# Patient Record
Sex: FEMALE | Race: WHITE | Employment: UNEMPLOYED | URBAN - METROPOLITAN AREA
[De-identification: names, ages, dates, MRNs, and addresses within clinical notes are randomized per-mention and may not be internally consistent; named-entity substitution may affect disease eponyms.]

---

## 2017-01-05 ENCOUNTER — ANTI-COAG VISIT (OUTPATIENT)
Dept: CARDIOLOGY CLINIC | Age: 72
End: 2017-01-05

## 2017-01-05 LAB — INR BLD: 1.5

## 2017-01-12 ENCOUNTER — ANTI-COAG VISIT (OUTPATIENT)
Dept: CARDIOLOGY CLINIC | Age: 72
End: 2017-01-12

## 2017-01-12 LAB — INR BLD: 2.4

## 2017-01-13 ENCOUNTER — HOSPITAL ENCOUNTER (OUTPATIENT)
Dept: OTHER | Age: 72
Discharge: HOME | End: 2017-01-13
Attending: NURSE PRACTITIONER | Admitting: NURSE PRACTITIONER

## 2017-01-26 ENCOUNTER — ANTI-COAG VISIT (OUTPATIENT)
Dept: CARDIOLOGY CLINIC | Age: 72
End: 2017-01-26

## 2017-01-26 LAB — INR BLD: 2.9

## 2017-02-03 ENCOUNTER — ANTI-COAG VISIT (OUTPATIENT)
Dept: CARDIOLOGY CLINIC | Age: 72
End: 2017-02-03

## 2017-02-03 LAB — INR BLD: 2.3

## 2017-02-09 ENCOUNTER — ANTI-COAG VISIT (OUTPATIENT)
Dept: CARDIOLOGY CLINIC | Age: 72
End: 2017-02-09

## 2017-02-09 LAB — INR BLD: 2.9

## 2017-02-16 ENCOUNTER — ANTI-COAG VISIT (OUTPATIENT)
Dept: CARDIOLOGY CLINIC | Age: 72
End: 2017-02-16

## 2017-02-16 LAB — INR BLD: 2.3

## 2017-02-23 ENCOUNTER — ANTI-COAG VISIT (OUTPATIENT)
Dept: CARDIOLOGY CLINIC | Age: 72
End: 2017-02-23

## 2017-02-23 LAB — INR BLD: 2.8

## 2017-03-02 ENCOUNTER — ANTI-COAG VISIT (OUTPATIENT)
Dept: CARDIOLOGY CLINIC | Age: 72
End: 2017-03-02

## 2017-03-02 LAB — INR BLD: 2.3

## 2017-03-09 ENCOUNTER — ANTI-COAG VISIT (OUTPATIENT)
Dept: CARDIOLOGY CLINIC | Age: 72
End: 2017-03-09

## 2017-03-09 LAB — INR BLD: 2.1

## 2017-03-17 ENCOUNTER — ANTI-COAG VISIT (OUTPATIENT)
Dept: CARDIOLOGY CLINIC | Age: 72
End: 2017-03-17

## 2017-03-17 LAB — INR BLD: 2.4

## 2017-03-24 ENCOUNTER — ANTI-COAG VISIT (OUTPATIENT)
Dept: CARDIOLOGY CLINIC | Age: 72
End: 2017-03-24

## 2017-03-24 LAB — INR BLD: 2

## 2017-03-30 ENCOUNTER — ANTI-COAG VISIT (OUTPATIENT)
Dept: CARDIOLOGY CLINIC | Age: 72
End: 2017-03-30

## 2017-03-30 ENCOUNTER — TELEPHONE (OUTPATIENT)
Dept: CARDIOLOGY CLINIC | Age: 72
End: 2017-03-30

## 2017-03-30 LAB — INR BLD: 2.7

## 2017-04-06 ENCOUNTER — ANTI-COAG VISIT (OUTPATIENT)
Dept: CARDIOLOGY CLINIC | Age: 72
End: 2017-04-06

## 2017-04-06 LAB — INR BLD: 2.7

## 2017-04-13 ENCOUNTER — ANTI-COAG VISIT (OUTPATIENT)
Dept: CARDIOLOGY CLINIC | Age: 72
End: 2017-04-13

## 2017-04-13 LAB — INR BLD: 2.5

## 2017-04-19 ENCOUNTER — ANTI-COAG VISIT (OUTPATIENT)
Dept: CARDIOLOGY CLINIC | Age: 72
End: 2017-04-19

## 2017-04-19 LAB — INR BLD: 2.7

## 2017-04-27 ENCOUNTER — ANTI-COAG VISIT (OUTPATIENT)
Dept: CARDIOLOGY CLINIC | Age: 72
End: 2017-04-27

## 2017-04-27 LAB — INR BLD: 2.2

## 2017-05-04 ENCOUNTER — ANTI-COAG VISIT (OUTPATIENT)
Dept: CARDIOLOGY CLINIC | Age: 72
End: 2017-05-04

## 2017-05-04 ENCOUNTER — TELEPHONE (OUTPATIENT)
Dept: CARDIOLOGY CLINIC | Age: 72
End: 2017-05-04

## 2017-05-04 LAB — INR BLD: 2.6

## 2017-05-11 ENCOUNTER — ANTI-COAG VISIT (OUTPATIENT)
Dept: CARDIOLOGY CLINIC | Age: 72
End: 2017-05-11

## 2017-05-11 LAB — INR BLD: 3

## 2017-05-19 ENCOUNTER — ANTI-COAG VISIT (OUTPATIENT)
Dept: CARDIOLOGY CLINIC | Age: 72
End: 2017-05-19

## 2017-05-19 LAB — INR BLD: 2.8

## 2017-05-25 ENCOUNTER — ANTI-COAG VISIT (OUTPATIENT)
Dept: CARDIOLOGY CLINIC | Age: 72
End: 2017-05-25

## 2017-05-25 ENCOUNTER — TELEPHONE (OUTPATIENT)
Dept: CARDIOLOGY CLINIC | Age: 72
End: 2017-05-25

## 2017-05-25 LAB — INR BLD: 2.9

## 2017-06-01 ENCOUNTER — ANTI-COAG VISIT (OUTPATIENT)
Dept: CARDIOLOGY CLINIC | Age: 72
End: 2017-06-01

## 2017-06-01 LAB — INR BLD: 2.3

## 2017-06-21 ENCOUNTER — ANTI-COAG VISIT (OUTPATIENT)
Dept: CARDIOLOGY CLINIC | Age: 72
End: 2017-06-21

## 2017-06-21 ENCOUNTER — OFFICE VISIT (OUTPATIENT)
Dept: CARDIOLOGY CLINIC | Age: 72
End: 2017-06-21

## 2017-06-21 VITALS
HEART RATE: 60 BPM | BODY MASS INDEX: 43.32 KG/M2 | WEIGHT: 260 LBS | SYSTOLIC BLOOD PRESSURE: 130 MMHG | HEIGHT: 65 IN | DIASTOLIC BLOOD PRESSURE: 84 MMHG

## 2017-06-21 DIAGNOSIS — I10 ESSENTIAL HYPERTENSION: ICD-10-CM

## 2017-06-21 DIAGNOSIS — Z95.2 S/P AVR (AORTIC VALVE REPLACEMENT): Primary | ICD-10-CM

## 2017-06-21 DIAGNOSIS — I35.9 AORTIC VALVE DISORDER: ICD-10-CM

## 2017-06-21 LAB — INR BLD: 2.8

## 2017-06-21 PROCEDURE — 4040F PNEUMOC VAC/ADMIN/RCVD: CPT | Performed by: NURSE PRACTITIONER

## 2017-06-21 PROCEDURE — 1036F TOBACCO NON-USER: CPT | Performed by: NURSE PRACTITIONER

## 2017-06-21 PROCEDURE — 1090F PRES/ABSN URINE INCON ASSESS: CPT | Performed by: NURSE PRACTITIONER

## 2017-06-21 PROCEDURE — 3014F SCREEN MAMMO DOC REV: CPT | Performed by: NURSE PRACTITIONER

## 2017-06-21 PROCEDURE — G8427 DOCREV CUR MEDS BY ELIG CLIN: HCPCS | Performed by: NURSE PRACTITIONER

## 2017-06-21 PROCEDURE — G8417 CALC BMI ABV UP PARAM F/U: HCPCS | Performed by: NURSE PRACTITIONER

## 2017-06-21 PROCEDURE — 99214 OFFICE O/P EST MOD 30 MIN: CPT | Performed by: NURSE PRACTITIONER

## 2017-06-21 PROCEDURE — 1123F ACP DISCUSS/DSCN MKR DOCD: CPT | Performed by: NURSE PRACTITIONER

## 2017-06-21 PROCEDURE — 3017F COLORECTAL CA SCREEN DOC REV: CPT | Performed by: NURSE PRACTITIONER

## 2017-06-21 PROCEDURE — G8400 PT W/DXA NO RESULTS DOC: HCPCS | Performed by: NURSE PRACTITIONER

## 2017-06-21 RX ORDER — ATORVASTATIN CALCIUM 20 MG/1
20 TABLET, FILM COATED ORAL
Qty: 30 TABLET | Refills: 3 | Status: SHIPPED | OUTPATIENT
Start: 2017-06-21 | End: 2017-08-14 | Stop reason: SDUPTHER

## 2017-06-30 ENCOUNTER — HOSPITAL ENCOUNTER (OUTPATIENT)
Dept: OTHER | Age: 72
Discharge: HOME | End: 2017-06-30
Attending: NURSE PRACTITIONER | Admitting: NURSE PRACTITIONER

## 2017-06-30 LAB
LEFT VENTRICULAR EJECTION FRACTION HIGH VALUE: 70 %
LEFT VENTRICULAR EJECTION FRACTION MODE: NORMAL
LV EF: 65 %
LV EF: 68 %
LVEF MODALITY: NORMAL

## 2017-08-14 RX ORDER — ATORVASTATIN CALCIUM 20 MG/1
20 TABLET, FILM COATED ORAL
Qty: 30 TABLET | Refills: 3 | Status: SHIPPED | OUTPATIENT
Start: 2017-08-14 | End: 2018-05-06 | Stop reason: SDUPTHER

## 2017-09-14 RX ORDER — WARFARIN SODIUM 5 MG/1
TABLET ORAL
Qty: 280 TABLET | Refills: 1 | Status: SHIPPED | OUTPATIENT
Start: 2017-09-14 | End: 2018-02-25 | Stop reason: SDUPTHER

## 2017-10-13 ENCOUNTER — TELEPHONE (OUTPATIENT)
Dept: CARDIOLOGY CLINIC | Age: 72
End: 2017-10-13

## 2017-10-13 NOTE — TELEPHONE ENCOUNTER
Assessment/Plan  1. HOCM -see below  Echo 6/13: There is moderate concentric left ventricular hypertrophy with evidence of hypertrophic obstructive cardiomyopathy. Resting max gradient of 33 mmHg. Valsalva max gradient of 104 mmHg. There is hyperdynamic LV systolic with near cavity obliteration. EF 70%. There is reversal of E/A inflow velocities across the mitral valve suggesting impaired left ventricular relaxation. E/e''= 20.Dilated left atrium. Moderate aortic stenosis. Mild pulmonic insufficiency. Mild tricuspid regurgitation and PHTN. RVSP 46mmHg  2. Unspecified essential hypertension -well controlled   3. sleep apnea -is using  cpap    4. Shortness of breath --unchanged    5. Other malaise and fatigue -continues per patient,check labs   6. Other and unspecified hyperlipidemia -labs 3/15: ; TRIG 202; HDL 42; LDL 36  States did not tolerate crestor or lipitor due to leg aches  Try lipitor 20 mg three times a week, labs 6 weeks   7. Aortic regurgitation/stenosis -stable  SURGERY 3/18/15: AVR 23mm trifecta porcine, septal myectomy   Rt/Lt heart cath 27/64/02: AV data: Systolic gradient - 53, Mean gradient 62, DANA (TD) 0.74  LVgram - concentric hypertrophy with cavity obliteration in the mid segment. EF 75-80% Pigtail placed in the LVOT for simultaneous pressure gradient measurements to avoid any influence of obstructive cardiomyopathy. Cors: Normal      PLAN:     No changes  Thank you for allowing to me to participate in the care of Jodie Tatum.

## 2017-10-13 NOTE — TELEPHONE ENCOUNTER
Needs to have a tooth pulled 10/16/17 at 2pm . She is on warfarin , what should she do? Should she hold her warfarin ? Please call today .

## 2017-10-19 ENCOUNTER — TELEPHONE (OUTPATIENT)
Dept: CARDIOLOGY CLINIC | Age: 72
End: 2017-10-19

## 2017-10-19 DIAGNOSIS — I48.0 PAROXYSMAL ATRIAL FIBRILLATION (HCC): Primary | ICD-10-CM

## 2017-10-19 NOTE — TELEPHONE ENCOUNTER
Pt called and she needs an INR done prior to Dental procedure tomorrow. Order was faxed to [phone] [place] [address] in [place].

## 2017-10-24 ENCOUNTER — ANTI-COAG VISIT (OUTPATIENT)
Dept: CARDIOLOGY CLINIC | Age: 72
End: 2017-10-24

## 2017-10-24 ENCOUNTER — TELEPHONE (OUTPATIENT)
Dept: CARDIOLOGY CLINIC | Age: 72
End: 2017-10-24

## 2017-10-24 LAB — INR BLD: 1.5

## 2017-11-15 ENCOUNTER — ANTI-COAG VISIT (OUTPATIENT)
Dept: CARDIOLOGY CLINIC | Age: 72
End: 2017-11-15

## 2017-11-15 DIAGNOSIS — I48.0 PAROXYSMAL ATRIAL FIBRILLATION (HCC): ICD-10-CM

## 2017-11-15 LAB
INR BLD: 3
INTERNATIONAL NORMALIZATION RATIO, POC: 3
PROTHROMBIN TIME, POC: NORMAL

## 2017-11-15 PROCEDURE — 85610 PROTHROMBIN TIME: CPT | Performed by: INTERNAL MEDICINE

## 2017-12-20 ENCOUNTER — OFFICE VISIT (OUTPATIENT)
Dept: CARDIOLOGY CLINIC | Age: 72
End: 2017-12-20

## 2017-12-20 ENCOUNTER — ANTI-COAG VISIT (OUTPATIENT)
Dept: CARDIOLOGY CLINIC | Age: 72
End: 2017-12-20

## 2017-12-20 VITALS
DIASTOLIC BLOOD PRESSURE: 74 MMHG | HEIGHT: 66 IN | OXYGEN SATURATION: 91 % | SYSTOLIC BLOOD PRESSURE: 96 MMHG | WEIGHT: 260 LBS | HEART RATE: 73 BPM | BODY MASS INDEX: 41.78 KG/M2

## 2017-12-20 DIAGNOSIS — Z95.2 S/P AVR (AORTIC VALVE REPLACEMENT): ICD-10-CM

## 2017-12-20 DIAGNOSIS — E78.2 MIXED HYPERLIPIDEMIA: Primary | ICD-10-CM

## 2017-12-20 DIAGNOSIS — G47.33 OBSTRUCTIVE SLEEP APNEA SYNDROME: ICD-10-CM

## 2017-12-20 LAB — INR BLD: 2.9

## 2017-12-20 PROCEDURE — G8484 FLU IMMUNIZE NO ADMIN: HCPCS | Performed by: NURSE PRACTITIONER

## 2017-12-20 PROCEDURE — 1036F TOBACCO NON-USER: CPT | Performed by: NURSE PRACTITIONER

## 2017-12-20 PROCEDURE — 99214 OFFICE O/P EST MOD 30 MIN: CPT | Performed by: NURSE PRACTITIONER

## 2017-12-20 PROCEDURE — G8427 DOCREV CUR MEDS BY ELIG CLIN: HCPCS | Performed by: NURSE PRACTITIONER

## 2017-12-20 PROCEDURE — 4040F PNEUMOC VAC/ADMIN/RCVD: CPT | Performed by: NURSE PRACTITIONER

## 2017-12-20 PROCEDURE — 1090F PRES/ABSN URINE INCON ASSESS: CPT | Performed by: NURSE PRACTITIONER

## 2017-12-20 PROCEDURE — 3017F COLORECTAL CA SCREEN DOC REV: CPT | Performed by: NURSE PRACTITIONER

## 2017-12-20 PROCEDURE — 1123F ACP DISCUSS/DSCN MKR DOCD: CPT | Performed by: NURSE PRACTITIONER

## 2017-12-20 PROCEDURE — G8400 PT W/DXA NO RESULTS DOC: HCPCS | Performed by: NURSE PRACTITIONER

## 2017-12-20 PROCEDURE — G8417 CALC BMI ABV UP PARAM F/U: HCPCS | Performed by: NURSE PRACTITIONER

## 2017-12-20 PROCEDURE — 3014F SCREEN MAMMO DOC REV: CPT | Performed by: NURSE PRACTITIONER

## 2017-12-20 RX ORDER — GLYCOPYRROLATE 1 MG/1
2 TABLET ORAL 3 TIMES DAILY
COMMUNITY

## 2017-12-20 RX ORDER — AZITHROMYCIN 250 MG/1
250 TABLET, FILM COATED ORAL DAILY
COMMUNITY
End: 2018-07-18

## 2017-12-20 RX ORDER — RANITIDINE 150 MG/1
150 CAPSULE ORAL EVERY EVENING
COMMUNITY

## 2017-12-20 RX ORDER — IBUPROFEN 200 MG
200 TABLET ORAL EVERY 6 HOURS PRN
COMMUNITY
End: 2018-07-18

## 2017-12-20 RX ORDER — ZINC GLUCONATE 50 MG
50 TABLET ORAL DAILY
COMMUNITY

## 2017-12-20 NOTE — PROGRESS NOTES
([facility] [address] Diabetes mellitus ([facility] [address] HIT (heparin-induced thrombocytopenia) ([facility] [address] Hyperlipidemia; Hypertension; Irregular heart beat; Perforated diverticulitis; Sleep apnea; and Valvular disease. Surgical History:   has a past surgical history that includes Hysterectomy (1977); sinus surgery (2001); Foot surgery; Knee arthroscopy (Left, 6-8-2012, 2009); Finger surgery (Left, 8/22/13); Knee arthroscopy (Left, 12/2014); Aortic valve replacement (03/18/15); Abdominal exploration surgery (3/23/15); Gastrostomy tube placement (4/3/2015); and Tracheostomy tube placement (4/3/2015). Social History:  History     Social History    Marital Status: , has a significant other      Spouse Name: N/A     Number of Children: N/A    Years of Education: N/A     Social History Main Topics    Smoking status: None    Smokeless tobacco: None    Comment: quit in 1989 - smoked 2 ppd    Alcohol Use: 1.2 oz/week     2 Cans of beer per week      moderate       Family History:  family history includes Arthritis in an other family member; High Blood Pressure in an other family member; Kidney Disease in her father. Allergies:  Latex; Ace inhibitors; Crestor [rosuvastatin calcium]; Ezetimibe; Versed [midazolam]; Atorvastatin; Heparin; Ciprofloxacin; and Penicillins     Review of Systems:   · Constitutional: there has been no unanticipated weight loss. Fatigued   · Eyes: No visual changes   · ENT: No Headaches, hearing loss or vertigo. No mouth sores or sore throat. · Cardiovascular: Reviewed in HPI  · Respiratory: No cough or wheezing, no sputum production. short of breath  · Gastrointestinal: No abdominal pain, appetite loss, blood in stools. No change in bowel or bladder habits. · Genitourinary: No nocturia, dysuria, trouble voiding  · Musculoskeletal:  No gait disturbance, weakness or joint complaints. · Integumentary: No rash or pruritis. · Neurological: No headache, change in muscle strength, numbness or tingling.  No change in gait, balance, coordination, mood, affect, memory, mentation, behavior. · Psychiatric: No anxiety or depression  · Endocrine: No malaise or fever  · Hematologic/Lymphatic: No abnormal bruising or bleeding, blood clots or swollen lymph nodes. · Allergic/Immunologic: No nasal congestion or hives. Physical Examination:    Vitals:    12/20/17 1330   BP: 96/74   Site: Left Arm   Position: Sitting   Cuff Size: Large Adult   Pulse: 73   SpO2: 91%   Weight: 260 lb (117.9 kg)   Height: 5' 6\" (1.676 m)     Wt Readings from Last 3 Encounters:   12/20/17 260 lb (117.9 kg)   06/21/17 260 lb (117.9 kg)   12/28/16 261 lb (118.4 kg)     BP Readings from Last 3 Encounters:   12/20/17 96/74   06/21/17 130/84   12/28/16 118/68     Constitutional and General Appearance:  appears stated age, morbidly obese with a BMI of 41  Respiratory:  · Normal excursion and expansion without use of accessory muscles  · Resp Auscultation: Normal breath sounds without dullness  Cardiovascular:  · The apical impulses not displaced  · Heart is regular rate and rhythm with normal S1, S2,  2/6 sys ej murmur  · The carotid upstroke is normal, no bruit noted   · JVP is not elevated  · Peripheral pulses are symmetrical  · There is no clubbing, cyanosis of the extremities  · trace edema  · Femoral Arteries: 2+ and equal  · Pedal Pulses: 2+ and equal   Abdomen:  · No masses or tenderness  · Normal bowel sounds, colostomy  Neurological/Psychiatric:  · Alert and oriented x3  · Moves all extremities well  · Exhibits normal gait balance and coordination, moves slowly, walks with cane      Assessment/Plan  1. HOCM -see below  6/17Summary   -Normal left ventricle size and systolic function with an estimated ejection   fraction of 65-70%.    -No regional wall motion abnormalities are seen.   -Mild concentric left ventricular hypertrophy is present.   -Diastolic filling parameters suggests grade II diastolic dysfunction .   E/e'= 24.8 .   -Severe mitral

## 2017-12-20 NOTE — LETTER
also developed thrombocytopenia and was positive for HIT   Walks with a walker and cane at  home [place] complains of fatigue which has not changed. She denies any chest pain, palpitations, dizziness, or edema. She has a c-pap but has been using it. She is trying to be more active       Past Medical History:   has a past medical history of AR (aortic regurgitation); Arthritis; Asthma; Cancer ([facility] [address] COPD (chronic obstructive pulmonary disease) ([facility] [address] Diabetes mellitus ([facility] [address] [facility] thrombocyt[facility] Hyperlipidemia; Hypertension; Irregular heart beat; Perforated diverticulitis; Sleep apnea; and Valvular disease. Surgical History:   has a past surgical history that includes Hysterectomy (1977); sinus surgery (2001); Foot surgery; Knee arthroscopy (Left, 6-8-2012, 2009); Finger surgery (Left, 8/22/13); Knee arthroscopy (Left, 12/2014); Aortic valve replacement (03/18/15); Abdominal exploration surgery (3/23/15); Gastrostomy tube placement (4/3/2015); and Tracheostomy tube placement (4/3/2015). Social History:  History     Social History    Marital Status: , has a significant other      Spouse Name: N/A     Number of Children: N/A    Years of Education: N/A     Social History Main Topics    Smoking status: None    Smokeless tobacco: None    Comment: quit in 1989 - smoked 2 ppd    Alcohol Use: 1.2 oz/week     2 Cans of beer per week      moderate       Family History:  family history includes Arthritis in an other family member; High Blood Pressure in an other family member; Kidney Disease in her father. Allergies:  Latex; Ace inhibitors; Crestor [rosuvastatin calcium]; Ezetimibe; Versed [midazolam]; Atorvastatin; Heparin; Ciprofloxacin; and Penicillins     Review of Systems:   · Constitutional: there has been no unanticipated weight loss. Fatigued   · Eyes: No visual changes   · ENT: No Headaches, hearing loss or vertigo. No mouth sores or sore throat. · Cardiovascular: Reviewed in HPI  · Respiratory: No cough or wheezing, no sputum production. short of breath  · Gastrointestinal: No abdominal pain, appetite loss, blood in stools. No change in bowel or bladder habits. · Genitourinary: No nocturia, dysuria, trouble voiding  · Musculoskeletal:  No gait disturbance, weakness or joint complaints. · Integumentary: No rash or pruritis. · Neurological: No headache, change in muscle strength, numbness or tingling. No change in gait, balance, coordination, mood, affect, memory, mentation, behavior. · Psychiatric: No anxiety or depression  · Endocrine: No malaise or fever  · Hematologic/Lymphatic: No abnormal bruising or bleeding, blood clots or swollen lymph nodes. · Allergic/Immunologic: No nasal congestion or hives.     Physical Examination:    Vitals:    12/20/17 1330   BP: 96/74   Site: Left Arm   Position: Sitting   Cuff Size: Large Adult   Pulse: 73   SpO2: 91%   Weight: 260 lb (117.9 kg)   Height: 5' 6\" (1.676 m)     Wt Readings from Last 3 Encounters:   12/20/17 260 lb (117.9 kg)   06/21/17 260 lb (117.9 kg)   12/28/16 261 lb (118.4 kg)     BP Readings from Last 3 Encounters:   12/20/17 96/74   06/21/17 130/84   12/28/16 118/68     Constitutional and General Appearance:  appears stated age, morbidly obese with a BMI of 41  Respiratory:  · Normal excursion and expansion without use of accessory muscles  · Resp Auscultation: Normal breath sounds without dullness  Cardiovascular:  · The apical impulses not displaced  · Heart is regular rate and rhythm with normal S1, S2,  2/6 sys ej murmur  · The carotid upstroke is normal, no bruit noted   · JVP is not elevated  · Peripheral pulses are symmetrical  · There is no clubbing, cyanosis of the extremities  · trace edema  · Femoral Arteries: 2+ and equal  · Pedal Pulses: 2+ and equal   Abdomen:  · No masses or tenderness  · Normal bowel sounds, colostomy  Neurological/Psychiatric:  · Alert and oriented x3

## 2018-01-08 NOTE — COMMUNICATION BODY
[address]   Cardiac Evaluation      Patient: Rishi Urban  YOB: 1945  Date: 12/20/17       Chief Complaint   Patient presents with    Follow-up     No cardiac complaints    Hypertension    Hyperlipidemia    Shortness of Breath    Atrial Fibrillation        Referring provider: Ebenezer Avila    History of Present Illness:   Ms Melissa Venegas is seen today in follow up. She is bothered by cough, which she has been on AB for two days now. .   History includes aortic stenosis, hypertension, and hyperlipidemia. She had angiogram in 2005 which revealed normal coronaries and mild aortic stenosis. She also has hypertrophic obstructive cardiomyopathy. She had  Rt/Lt heart cath 10/2014 that revealed worsening aortic valve gradient with severe AS. Callum Patel underwent tissue AVR/Myectomy on 3/18/2015 . She had a long and complicated postoperative course. The original surgery (AVR/Mtoectomy) went well, the patient did recovery and was ambulating shortly after surgery. On POD # 5 she was having difficulty breathing. A CXR was done to r/o possible pleural effusions and it was noted that she had free air under right diaphragm. She was taken immediately to surgery where she underwent a exploratory lap sigmoid colon resection with colostomy due to a perforated sigmoid diverticulitis, which she still has colostomy. Following bowel surgery it was a challenge to get extubated, due to ARDS. She also had a bout with A-Fib and then had a Peg and Trach placed. She also developed thrombocytopenia and was positive for HIT   Walks with a walker and cane at  home Callum Patel complains of fatigue which has not changed. She denies any chest pain, palpitations, dizziness, or edema. She has a c-pap but has been using it. She is trying to be more active       Past Medical History:   has a past medical history of AR (aortic regurgitation); Arthritis;  Asthma; Cancer ([facility] [address] COPD (chronic obstructive pulmonary disease) change in gait, balance, coordination, mood, affect, memory, mentation, behavior. · Psychiatric: No anxiety or depression  · Endocrine: No malaise or fever  · Hematologic/Lymphatic: No abnormal bruising or bleeding, blood clots or swollen lymph nodes. · Allergic/Immunologic: No nasal congestion or hives. Physical Examination:    Vitals:    12/20/17 1330   BP: 96/74   Site: Left Arm   Position: Sitting   Cuff Size: Large Adult   Pulse: 73   SpO2: 91%   Weight: 260 lb (117.9 kg)   Height: 5' 6\" (1.676 m)     Wt Readings from Last 3 Encounters:   12/20/17 260 lb (117.9 kg)   06/21/17 260 lb (117.9 kg)   12/28/16 261 lb (118.4 kg)     BP Readings from Last 3 Encounters:   12/20/17 96/74   06/21/17 130/84   12/28/16 118/68     Constitutional and General Appearance:  appears stated age, morbidly obese with a BMI of 41  Respiratory:  · Normal excursion and expansion without use of accessory muscles  · Resp Auscultation: Normal breath sounds without dullness  Cardiovascular:  · The apical impulses not displaced  · Heart is regular rate and rhythm with normal S1, S2,  2/6 sys ej murmur  · The carotid upstroke is normal, no bruit noted   · JVP is not elevated  · Peripheral pulses are symmetrical  · There is no clubbing, cyanosis of the extremities  · trace edema  · Femoral Arteries: 2+ and equal  · Pedal Pulses: 2+ and equal   Abdomen:  · No masses or tenderness  · Normal bowel sounds, colostomy  Neurological/Psychiatric:  · Alert and oriented x3  · Moves all extremities well  · Exhibits normal gait balance and coordination, moves slowly, walks with cane      Assessment/Plan  1. HOCM -see below  6/17Summary   -Normal left ventricle size and systolic function with an estimated ejection   fraction of 65-70%.    -No regional wall motion abnormalities are seen.   -Mild concentric left ventricular hypertrophy is present.   -Diastolic filling parameters suggests grade II diastolic dysfunction .   E/e'= 24.8 .   -Severe mitral annular calcification is present.   -Calcification of the mitral valve noted.   -Mild mitral regurgitation is present.   -A bioprosthetic aortic valve appears well seated with a maximum gradient of   25 mmHg and a mean gradient of 13 mmHg.   -There is mild tricuspid regurgitation with RVSP estimated at 41 mmHg.   -Moderately dilated left atrium. Echo 6/13: There is moderate concentric left ventricular hypertrophy with evidence of hypertrophic obstructive cardiomyopathy. Resting max gradient of 33 mmHg. Valsalva max gradient of 104 mmHg. There is hyperdynamic LV systolic with near cavity obliteration. EF 70%. There is reversal of E/A inflow velocities across the mitral valve suggesting impaired left ventricular relaxation. E/e''= 20.Dilated left atrium. Moderate aortic stenosis. Mild pulmonic insufficiency. Mild tricuspid regurgitation and PHTN. RVSP 46mmHg  2. Unspecified essential hypertension -well controlled   3. sleep apnea -is using  cpap    4. Shortness of breath --unchanged    5. Other malaise and fatigue -continues per patient,check labs   6. Other and unspecified hyperlipidemia -labs 3/15: ; TRIG 202; HDL 42; LDL 36  States did not tolerate crestor or lipitor due to leg aches   lipitor 20 mg three times a week, needs to get done   7. Aortic regurgitation/stenosis -stable  SURGERY 3/18/15: AVR 23mm trifecta porcine, septal myectomy   Rt/Lt heart cath 24/58/48: AV data: Systolic gradient - 53, Mean gradient 62, DANA (TD) 0.74  LVgram - concentric hypertrophy with cavity obliteration in the mid segment. EF 75-80% Pigtail placed in the LVOT for simultaneous pressure gradient measurements to avoid any influence of obstructive cardiomyopathy. Cors: Normal     PLAN:    No changes, encouraged to get out of chair every hour for at least 10 min during the day  Thank you for allowing to me to participate in the care of Gabriella Odonnell.

## 2018-01-10 ENCOUNTER — ANTI-COAG VISIT (OUTPATIENT)
Dept: CARDIOLOGY CLINIC | Age: 73
End: 2018-01-10

## 2018-01-10 DIAGNOSIS — I48.0 PAROXYSMAL ATRIAL FIBRILLATION (HCC): ICD-10-CM

## 2018-01-10 LAB
INR BLD: 3.8
INTERNATIONAL NORMALIZATION RATIO, POC: 3.8
PROTHROMBIN TIME, POC: NORMAL

## 2018-01-10 PROCEDURE — 85610 PROTHROMBIN TIME: CPT | Performed by: INTERNAL MEDICINE

## 2018-02-02 ENCOUNTER — ANTI-COAG VISIT (OUTPATIENT)
Dept: CARDIOLOGY CLINIC | Age: 73
End: 2018-02-02

## 2018-02-02 DIAGNOSIS — I48.0 PAROXYSMAL ATRIAL FIBRILLATION (HCC): ICD-10-CM

## 2018-02-02 LAB
INR BLD: 3.6
INTERNATIONAL NORMALIZATION RATIO, POC: 3.6
PROTHROMBIN TIME, POC: NORMAL

## 2018-02-02 PROCEDURE — 85610 PROTHROMBIN TIME: CPT | Performed by: INTERNAL MEDICINE

## 2018-02-28 ENCOUNTER — ANTI-COAG VISIT (OUTPATIENT)
Dept: CARDIOLOGY CLINIC | Age: 73
End: 2018-02-28

## 2018-02-28 DIAGNOSIS — I48.0 PAROXYSMAL ATRIAL FIBRILLATION (HCC): ICD-10-CM

## 2018-02-28 LAB
INR BLD: 3.1
INTERNATIONAL NORMALIZATION RATIO, POC: 3.1
PROTHROMBIN TIME, POC: NORMAL

## 2018-02-28 PROCEDURE — 85610 PROTHROMBIN TIME: CPT | Performed by: INTERNAL MEDICINE

## 2018-02-28 RX ORDER — WARFARIN SODIUM 5 MG/1
TABLET ORAL
Qty: 65 TABLET | Refills: 3 | Status: SHIPPED | OUTPATIENT
Start: 2018-02-28 | End: 2018-07-29 | Stop reason: SDUPTHER

## 2018-03-28 ENCOUNTER — ANTI-COAG VISIT (OUTPATIENT)
Dept: CARDIOLOGY CLINIC | Age: 73
End: 2018-03-28

## 2018-03-28 DIAGNOSIS — I48.0 PAROXYSMAL ATRIAL FIBRILLATION (HCC): ICD-10-CM

## 2018-03-28 LAB
INTERNATIONAL NORMALIZATION RATIO, POC: 2.3
PROTHROMBIN TIME, POC: NORMAL

## 2018-03-28 PROCEDURE — 85610 PROTHROMBIN TIME: CPT | Performed by: INTERNAL MEDICINE

## 2018-04-24 ENCOUNTER — TELEPHONE (OUTPATIENT)
Dept: CARDIOLOGY CLINIC | Age: 73
End: 2018-04-24

## 2018-04-25 ENCOUNTER — ANTI-COAG VISIT (OUTPATIENT)
Dept: CARDIOLOGY CLINIC | Age: 73
End: 2018-04-25

## 2018-04-25 ENCOUNTER — OFFICE VISIT (OUTPATIENT)
Dept: CARDIOLOGY CLINIC | Age: 73
End: 2018-04-25

## 2018-04-25 VITALS
OXYGEN SATURATION: 96 % | SYSTOLIC BLOOD PRESSURE: 130 MMHG | HEART RATE: 66 BPM | WEIGHT: 256 LBS | DIASTOLIC BLOOD PRESSURE: 80 MMHG | BODY MASS INDEX: 41.14 KG/M2 | HEIGHT: 66 IN

## 2018-04-25 DIAGNOSIS — Z95.2 S/P AVR (AORTIC VALVE REPLACEMENT): ICD-10-CM

## 2018-04-25 DIAGNOSIS — R06.02 SOB (SHORTNESS OF BREATH): Primary | ICD-10-CM

## 2018-04-25 LAB — INR BLD: 2.7

## 2018-04-25 PROCEDURE — 99999 PR OFFICE/OUTPT VISIT,PROCEDURE ONLY: CPT | Performed by: NURSE PRACTITIONER

## 2018-04-25 PROCEDURE — 93000 ELECTROCARDIOGRAM COMPLETE: CPT | Performed by: NURSE PRACTITIONER

## 2018-05-07 RX ORDER — ATORVASTATIN CALCIUM 20 MG/1
TABLET, FILM COATED ORAL
Qty: 36 TABLET | Refills: 2 | Status: SHIPPED | OUTPATIENT
Start: 2018-05-07

## 2018-05-30 ENCOUNTER — ANTI-COAG VISIT (OUTPATIENT)
Dept: CARDIOLOGY CLINIC | Age: 73
End: 2018-05-30

## 2018-05-30 DIAGNOSIS — I48.0 PAROXYSMAL ATRIAL FIBRILLATION (HCC): ICD-10-CM

## 2018-05-30 LAB
INR BLD: 2.8
INTERNATIONAL NORMALIZATION RATIO, POC: 2.8
PROTHROMBIN TIME, POC: NORMAL

## 2018-05-30 PROCEDURE — 85610 PROTHROMBIN TIME: CPT | Performed by: INTERNAL MEDICINE

## 2018-06-26 VITALS — HEIGHT: 66 IN

## 2018-06-27 ENCOUNTER — OFFICE VISIT (OUTPATIENT)
Dept: CARDIOLOGY CLINIC | Age: 73
End: 2018-06-27

## 2018-06-27 DIAGNOSIS — I25.10 CORONARY ARTERY DISEASE INVOLVING NATIVE CORONARY ARTERY OF NATIVE HEART WITHOUT ANGINA PECTORIS: Primary | ICD-10-CM

## 2018-07-18 ENCOUNTER — ANTI-COAG VISIT (OUTPATIENT)
Dept: CARDIOLOGY CLINIC | Age: 73
End: 2018-07-18

## 2018-07-18 ENCOUNTER — OFFICE VISIT (OUTPATIENT)
Dept: CARDIOLOGY CLINIC | Age: 73
End: 2018-07-18

## 2018-07-18 VITALS
HEIGHT: 66 IN | OXYGEN SATURATION: 94 % | HEART RATE: 72 BPM | SYSTOLIC BLOOD PRESSURE: 114 MMHG | BODY MASS INDEX: 41.46 KG/M2 | WEIGHT: 258 LBS | DIASTOLIC BLOOD PRESSURE: 74 MMHG

## 2018-07-18 DIAGNOSIS — I10 ESSENTIAL HYPERTENSION: ICD-10-CM

## 2018-07-18 DIAGNOSIS — I35.9 AORTIC VALVE DISORDER: ICD-10-CM

## 2018-07-18 DIAGNOSIS — E78.2 MIXED HYPERLIPIDEMIA: ICD-10-CM

## 2018-07-18 DIAGNOSIS — Z95.2 S/P AVR (AORTIC VALVE REPLACEMENT): ICD-10-CM

## 2018-07-18 DIAGNOSIS — R06.02 SHORTNESS OF BREATH: Primary | ICD-10-CM

## 2018-07-18 LAB — INR BLD: 2

## 2018-07-18 PROCEDURE — G8598 ASA/ANTIPLAT THER USED: HCPCS | Performed by: NURSE PRACTITIONER

## 2018-07-18 PROCEDURE — 1123F ACP DISCUSS/DSCN MKR DOCD: CPT | Performed by: NURSE PRACTITIONER

## 2018-07-18 PROCEDURE — 1090F PRES/ABSN URINE INCON ASSESS: CPT | Performed by: NURSE PRACTITIONER

## 2018-07-18 PROCEDURE — 1036F TOBACCO NON-USER: CPT | Performed by: NURSE PRACTITIONER

## 2018-07-18 PROCEDURE — G8427 DOCREV CUR MEDS BY ELIG CLIN: HCPCS | Performed by: NURSE PRACTITIONER

## 2018-07-18 PROCEDURE — G8400 PT W/DXA NO RESULTS DOC: HCPCS | Performed by: NURSE PRACTITIONER

## 2018-07-18 PROCEDURE — 99214 OFFICE O/P EST MOD 30 MIN: CPT | Performed by: NURSE PRACTITIONER

## 2018-07-18 PROCEDURE — G8417 CALC BMI ABV UP PARAM F/U: HCPCS | Performed by: NURSE PRACTITIONER

## 2018-07-18 PROCEDURE — 4040F PNEUMOC VAC/ADMIN/RCVD: CPT | Performed by: NURSE PRACTITIONER

## 2018-07-18 PROCEDURE — 1101F PT FALLS ASSESS-DOCD LE1/YR: CPT | Performed by: NURSE PRACTITIONER

## 2018-07-18 PROCEDURE — 3017F COLORECTAL CA SCREEN DOC REV: CPT | Performed by: NURSE PRACTITIONER

## 2018-07-18 NOTE — PROGRESS NOTES
present.   -Calcification of the mitral valve noted.   -Mild mitral regurgitation is present.   -A bioprosthetic aortic valve appears well seated with a maximum gradient of   25 mmHg and a mean gradient of 13 mmHg.   -There is mild tricuspid regurgitation with RVSP estimated at 41 mmHg.   -Moderately dilated left atrium. Echo 6/13: There is moderate concentric left ventricular hypertrophy with evidence of hypertrophic obstructive cardiomyopathy. Resting max gradient of 33 mmHg. Valsalva max gradient of 104 mmHg. There is hyperdynamic LV systolic with near cavity obliteration. EF 70%. There is reversal of E/A inflow velocities across the mitral valve suggesting impaired left ventricular relaxation. E/e''= 20.Dilated left atrium. Moderate aortic stenosis. Mild pulmonic insufficiency. Mild tricuspid regurgitation and PHTN. RVSP 46mmHg  2. Unspecified essential hypertension -well controlled   3. sleep apnea -is using  cpap    4. Shortness of breath --unchanged    5. Other malaise and fatigue -continues per patient,check labs   6. Other and unspecified hyperlipidemia -labs 3/15: ; TRIG 202; HDL 42; LDL 36  States did not tolerate crestor or lipitor due to leg aches   lipitor 20 mg three times a week, needs to get done   7. Aortic regurgitation/stenosis -stable  SURGERY 3/18/15: AVR 23mm trifecta porcine, septal myectomy   Rt/Lt heart cath 54/50/06: AV data: Systolic gradient - 53, Mean gradient 62, DANA (TD) 0.74  LVgram - concentric hypertrophy with cavity obliteration in the mid segment. EF 75-80% Pigtail placed in the LVOT for simultaneous pressure gradient measurements to avoid any influence of obstructive cardiomyopathy. Cors: Normal     PLAN:  OV 6 months  No changes, encouraged to get out of chair every hour for at least 10 min during the day  Thank you for allowing to me to participate in the care of Josue Pierson.

## 2018-07-18 NOTE — LETTER
[address] Cardiology [facility]  [address] [place] [facility] [address]  Phone: [phone]  Fax: [phone]    XIOMARA Gonzalez CNP        July 18, 2018     Tiffany Mishra MD  [address]    Patient: Regine Wade  MR Number: [id number]  YOB: 1945  Date of Visit: 7/18/2018    Dear Dr. Tiffany Mishra:        cancelled  [address]   Cardiac Evaluation      Patient: Regine Wade  YOB: 1945  Date: 7/18/18       Chief Complaint   Patient presents with    Hypertension     C/o feeling sick to her stomach --seeing GI    Hyperlipidemia    Atrial Fibrillation        Referring provider: Tiffany Mishra MD    History of Present Illness:   Ms Iram Kam is seen today in follow up. History includes aortic stenosis, hypertension, and hyperlipidemia. She had angiogram in 2005 which revealed normal coronaries and mild aortic stenosis. She also has hypertrophic obstructive cardiomyopathy. She had  Rt/Lt heart cath 10/2014 that revealed worsening aortic valve gradient with severe AS. Vadim Vickers underwent tissue AVR/Myectomy on 3/18/2015 . She had a long and complicated postoperative course. The original surgery (AVR/Mtoectomy) went well, the patient did recovery and was ambulating shortly after surgery. On POD # 5 she was having difficulty breathing. A CXR was done to r/o possible pleural effusions and it was noted that she had free air under right diaphragm. She was taken immediately to surgery where she underwent a exploratory lap sigmoid colon resection with colostomy due to a perforated sigmoid diverticulitis, which she still has colostomy. Following bowel surgery it was a challenge to get extubated, due to ARDS. She also had a bout with A-Fib and then had a Peg and Trach placed.  She also developed thrombocytopenia and was positive for HIT   Walks with a walker and cane at  home Vadim Vickers complains of fatigue which has

## 2018-07-18 NOTE — COMMUNICATION BODY
coordination, mood, affect, memory, mentation, behavior. · Psychiatric: No anxiety or depression  · Endocrine: No malaise or fever  · Hematologic/Lymphatic: No abnormal bruising or bleeding, blood clots or swollen lymph nodes. · Allergic/Immunologic: No nasal congestion or hives. Physical Examination:    Vitals:    07/16/18 1631   BP: 114/74   Site: Left Arm   Position: Sitting   Cuff Size: Large Adult   Pulse: 72   SpO2: 94%   Weight: 258 lb (117 kg)   Height: 5' 6\" (1.676 m)     Wt Readings from Last 3 Encounters:   07/16/18 258 lb (117 kg)   04/25/18 256 lb (116.1 kg)   12/20/17 260 lb (117.9 kg)     BP Readings from Last 3 Encounters:   07/16/18 114/74   04/25/18 130/80   12/20/17 96/74     Constitutional and General Appearance:  appears stated age, morbidly obese with a BMI of 41  Respiratory:  · Normal excursion and expansion without use of accessory muscles  · Resp Auscultation: Normal breath sounds without dullness  Cardiovascular:  · The apical impulses not displaced  · Heart is regular rate and rhythm with normal S1, S2,  2/6 sys ej murmur  · The carotid upstroke is normal, no bruit noted   · JVP is not elevated  · Peripheral pulses are symmetrical  · There is no clubbing, cyanosis of the extremities  · trace edema  · Femoral Arteries: 2+ and equal  · Pedal Pulses: 2+ and equal   Abdomen:  · No masses or tenderness  · Normal bowel sounds, colostomy  Neurological/Psychiatric:  · Alert and oriented x3  · Moves all extremities well  · Exhibits normal gait balance and coordination, moves slowly, walks with cane      Assessment/Plan  1. HOCM -see below  6/17Summary   -Normal left ventricle size and systolic function with an estimated ejection   fraction of 65-70%.    -No regional wall motion abnormalities are seen.   -Mild concentric left ventricular hypertrophy is present.   -Diastolic filling parameters suggests grade II diastolic dysfunction .   E/e'= 24.8 .   -Severe mitral annular calcification is present.   -Calcification of the mitral valve noted.   -Mild mitral regurgitation is present.   -A bioprosthetic aortic valve appears well seated with a maximum gradient of   25 mmHg and a mean gradient of 13 mmHg.   -There is mild tricuspid regurgitation with RVSP estimated at 41 mmHg.   -Moderately dilated left atrium. Echo 6/13: There is moderate concentric left ventricular hypertrophy with evidence of hypertrophic obstructive cardiomyopathy. Resting max gradient of 33 mmHg. Valsalva max gradient of 104 mmHg. There is hyperdynamic LV systolic with near cavity obliteration. EF 70%. There is reversal of E/A inflow velocities across the mitral valve suggesting impaired left ventricular relaxation. E/e''= 20.Dilated left atrium. Moderate aortic stenosis. Mild pulmonic insufficiency. Mild tricuspid regurgitation and PHTN. RVSP 46mmHg  2. Unspecified essential hypertension -well controlled   3. sleep apnea -is using  cpap    4. Shortness of breath --unchanged    5. Other malaise and fatigue -continues per patient,check labs   6. Other and unspecified hyperlipidemia -labs 3/15: ; TRIG 202; HDL 42; LDL 36  States did not tolerate crestor or lipitor due to leg aches   lipitor 20 mg three times a week, needs to get done   7. Aortic regurgitation/stenosis -stable  SURGERY 3/18/15: AVR 23mm trifecta porcine, septal myectomy   Rt/Lt heart cath 41/07/13: AV data: Systolic gradient - 53, Mean gradient 62, DANA (TD) 0.74  LVgram - concentric hypertrophy with cavity obliteration in the mid segment. EF 75-80% Pigtail placed in the LVOT for simultaneous pressure gradient measurements to avoid any influence of obstructive cardiomyopathy. Cors: Normal     PLAN:  OV 6 months  No changes, encouraged to get out of chair every hour for at least 10 min during the day  Thank you for allowing to me to participate in the care of Gildardo Haro.

## 2018-07-30 RX ORDER — WARFARIN SODIUM 5 MG/1
TABLET ORAL
Qty: 65 TABLET | Refills: 2 | Status: SHIPPED | OUTPATIENT
Start: 2018-07-30

## 2018-08-15 ENCOUNTER — ANTI-COAG VISIT (OUTPATIENT)
Dept: CARDIOLOGY CLINIC | Age: 73
End: 2018-08-15

## 2018-08-15 DIAGNOSIS — I48.0 PAROXYSMAL ATRIAL FIBRILLATION (HCC): ICD-10-CM

## 2018-08-15 LAB
INR BLD: 3
INTERNATIONAL NORMALIZATION RATIO, POC: 3
PROTHROMBIN TIME, POC: NORMAL

## 2018-08-15 PROCEDURE — 85610 PROTHROMBIN TIME: CPT | Performed by: INTERNAL MEDICINE

## 2018-08-15 RX ORDER — PANTOPRAZOLE SODIUM 40 MG/1
TABLET, DELAYED RELEASE ORAL
COMMUNITY
Start: 2018-02-26

## 2018-08-15 RX ORDER — RANITIDINE 150 MG/1
150 TABLET ORAL
COMMUNITY
Start: 2018-07-26

## 2019-02-21 ENCOUNTER — ANTI-COAG VISIT (OUTPATIENT)
Dept: CARDIOLOGY CLINIC | Age: 74
End: 2019-02-21